# Patient Record
Sex: MALE | Race: WHITE
[De-identification: names, ages, dates, MRNs, and addresses within clinical notes are randomized per-mention and may not be internally consistent; named-entity substitution may affect disease eponyms.]

---

## 2020-01-20 ENCOUNTER — HOSPITAL ENCOUNTER (INPATIENT)
Dept: HOSPITAL 77 - KA.MS | Age: 50
LOS: 4 days | Discharge: HOME | DRG: 383 | End: 2020-01-24
Attending: FAMILY MEDICINE | Admitting: NURSE PRACTITIONER
Payer: COMMERCIAL

## 2020-01-20 DIAGNOSIS — R79.1: ICD-10-CM

## 2020-01-20 DIAGNOSIS — E66.9: ICD-10-CM

## 2020-01-20 DIAGNOSIS — L30.9: ICD-10-CM

## 2020-01-20 DIAGNOSIS — I87.2: ICD-10-CM

## 2020-01-20 DIAGNOSIS — K59.00: ICD-10-CM

## 2020-01-20 DIAGNOSIS — L03.116: Primary | ICD-10-CM

## 2020-01-20 LAB
ANION GAP SERPL CALC-SCNC: 13.3 MMOL/L (ref 5–15)
CHLORIDE SERPL-SCNC: 98 MMOL/L (ref 98–115)
SODIUM SERPL-SCNC: 133 MMOL/L (ref 136–145)

## 2020-01-20 PROCEDURE — G0378 HOSPITAL OBSERVATION PER HR: HCPCS

## 2020-01-20 RX ADMIN — ENOXAPARIN SODIUM SCH MG: 150 INJECTION SUBCUTANEOUS at 21:31

## 2020-01-21 LAB
BASE EXCESS BLDA CALC-SCNC: -2 MMOL/L (ref -2–3)
HCO3 BLDA-SCNC: 22.3 MMOL/L (ref 22–26)
INHALED O2 MECHANISM DOSE: (no result)
PCO2 BLDA: 33 MMHG (ref 35–45)
PO2 BLDA: 84 MMHG (ref 80–105)
SAO2 % BLDA: 97 % (ref 95–98)

## 2020-01-21 RX ADMIN — ENOXAPARIN SODIUM SCH MG: 150 INJECTION SUBCUTANEOUS at 22:10

## 2020-01-21 RX ADMIN — Medication PRN ML: at 12:30

## 2020-01-21 RX ADMIN — ENOXAPARIN SODIUM SCH MG: 150 INJECTION SUBCUTANEOUS at 09:16

## 2020-01-21 NOTE — PCM.PN
- General Info


Date of Service: 01/21/20


Functional Status: Reports: Pain Controlled, Tolerating Diet, Urinating.  Denies

: Ambulating, New Symptoms





- Review of Systems


General: Denies: Fever, Weakness, Fatigue, Malaise


HEENT: Reports: No Symptoms


Pulmonary: Reports: No Symptoms


Cardiovascular: Reports: No Symptoms


Gastrointestinal: Reports: No Symptoms


Genitourinary: Reports: No Symptoms


Musculoskeletal: Reports: No Symptoms


Skin: Reports: Rash


Neurological: Reports: No Symptoms


Psychiatric: Reports: No Symptoms





- Patient Data


Vitals - Most Recent: 


 Last Vital Signs











Temp  98.8 F   01/21/20 06:40


 


Pulse  82   01/21/20 06:40


 


Resp  18   01/21/20 06:40


 


BP  129/78   01/21/20 06:40


 


Pulse Ox  99   01/21/20 06:40











Weight - Most Recent: 253 lb 9.6 oz


I&O - Last 24 Hours: 


 Intake & Output











 01/20/20 01/21/20 01/21/20





 22:59 06:59 14:59


 


Intake Total  90 


 


Balance  90 











Lab Results Last 24 Hours: 


 Laboratory Results - last 24 hr











  01/20/20 01/20/20 01/20/20 Range/Units





  19:40 19:40 19:40 


 


WBC  7.42    (5.00-10.00)  10^3/uL


 


RBC  4.88    (4.50-6.00)  10^6/uL


 


Hgb  14.8    (13.0-17.0)  g/dL


 


Hct  42.7    (40.0-52.0)  %


 


MCV  87.5    (82.0-92.0)  fL


 


MCH  30.3    (27.0-31.0)  pg


 


MCHC  34.7    (32.0-36.0)  g/dL


 


RDW  12.8    (11.5-14.5)  %


 


Plt Count  235    (150-400)  10^3/uL


 


MPV  9.1    (7.4-10.4)  fL


 


Immature Gran % (Auto)  0.1    (0.0-5.0)  %


 


Neut % (Auto)  70.7 H    (50.0-70.0)  %


 


Lymph % (Auto)  18.3 L    (20.0-40.0)  %


 


Mono % (Auto)  9.0 H    (2.0-8.0)  %


 


Eos % (Auto)  1.6    (1.0-3.0)  %


 


Baso % (Auto)  0.3    (0.0-1.0)  %


 


Immature Gran # (Auto)  0.01    (0.00-0.50)  10^3/uL


 


Neut # (Auto)  5.24    (2.50-7.00)  10^3/uL


 


Lymph # (Auto)  1.36    (1.00-4.00)  10^3/uL


 


Mono # (Auto)  0.67    (0.10-0.80)  10^3/uL


 


Eos # (Auto)  0.12    (0.10-0.30)  10^3/uL


 


Baso # (Auto)  0.02    (0.00-0.10)  10^3/uL


 


D-Dimer, Quantitative    747 H  (<400)  ng/mL


 


Sodium   133 L   (136-145)  mmol/L


 


Potassium   3.9   (3.3-5.3)  mmol/L


 


Chloride   98   ()  mmol/L


 


Carbon Dioxide   25.6   (21.0-32.0)  mmol/L


 


Anion Gap   13.3   (5-15)  mmol/L


 


BUN   15   (6-25)  mg/dL


 


Creatinine   1.00   (0.51-1.17)  mg/dL


 


Est Cr Clr Drug Dosing   TNP   


 


Estimated GFR (MDRD)   > 60   mL/min


 


Glucose   143 H  (75 - 99) mg/dL


 


Calcium   8.8   (8.7-10.3)  mg/dL


 


Total Bilirubin   0.4   (0.2-1.0)  mg/dL


 


AST   19   (15-37)  U/L


 


ALT   32   (12-78)  U/L


 


Alkaline Phosphatase   54   ()  IU/L


 


C-Reactive Protein   11.8 H   (0.0-0.9)  mg/dL


 


Total Protein   7.9   (6.4-8.2)  g/dL


 


Albumin   3.41   (3.00-4.80)  g/dL











Med Orders - Current: 


 Current Medications





Enoxaparin Sodium (Lovenox)  115 mg SUBCUT BID [facility]


   Last Admin: 01/21/20 09:16 Dose:  115 mg


Cefazolin Sodium 1.5 gm/ (Sodium Chloride)  50 mls @ 200 mls/hr IV Q8H [facility]


   Last Admin: 01/21/20 03:50 Dose:  200 mls/hr


Sodium Chloride (Saline Flush)  10 ml FLUSH Q8HR PRN


   PRN Reason: keep vein open





Discontinued Medications





Sodium Chloride (Normal Saline) Confirm Administered Dose 100 mls @ as directed 

.ROUTE .STK-MED ONE


   Stop: 01/20/20 20:59


   Last Admin: 01/20/20 21:00 Dose:  50 mls/hr











- Exam


Quality Assessment: DVT Prophylaxis.  No: Supplemental Oxygen


General: Alert, Oriented


Neck: Supple


Lungs: Clear to Auscultation, Normal Respiratory Effort


Cardiovascular: Regular Rate, Regular Rhythm


GI/Abdominal Exam: Normal Bowel Sounds, Soft, Non-Tender, No Organomegaly, No 

Distention, No Abnormal Bruit, No Mass, Pelvis Stable


Back Exam: No: CVA Tenderness (L), CVA Tenderness (R)


Wound/Incisions: Erythema


Psy/Mental Status: Alert, Normal Affect, Normal Mood





Sepsis Event Note





- Evaluation


Sepsis Screening Result: No Definite Risk





- Focused Exam


Vital Signs: 


 Vital Signs











  Temp Pulse Resp BP Pulse Ox Pulse Ox


 


 01/21/20 06:40  98.8 F  82  18  129/78  99 


 


 01/21/20 06:35       99


 


 01/20/20 22:58  98.3 F  87  18  138/80  99 











Date Exam was Performed: 01/21/20


Time Exam was Performed: 10:37





- Problem List Review


Problem List Initiated/Reviewed/Updated: Yes





- Plan


Plan:: 


Brief history


This 49-year-old obese gentleman was admitted due to cellulitis.  He was seen 

yesterday [facility] and was admitted by Erin Howe NP and came in 

due to 1 to 2-day l It is not painful when he walks but it does hurt to the 

touch.  He has not put anything on it or taken anything for it.   He denotes at 

one point in a dry patchy area on his leg in which a dermatologist diagnosed 

him with eczema.  He denies having any injury or break in skin. 





work-up to date, 


--elevated d-dimer, 


--blood cultures pending.  


--White count normal however elevated ESR. 


--cefazolin started 





Hospital course to date


Patient is sitting in a chair, no fever, much improved erythema receding from 

margins left lower extremity, negative chest pain.  negative shortness of 

breath negative Homans Hospital problems


--Cellulitis, LLL, improving,


--Rule out DVT, CT chest today


--Constipation 


--Venous insufficiency, presumptive








Disposition/overall plan


--ABG if Pa02 is normal likely will forego chest CT


--EKG


--venous Doppler


--Continue antibiotics,


--Elevate LLE, toes to nose


--DVT prophylaxis

## 2020-01-21 NOTE — US
______________________________________________________________________________   

  

[id number] US/US Venous Doppler LE Left  

EXAM: LEFT LOWER EXTREMITY DUPLEX ULTRASOUND  

   

 INDICATION: LEFT LOWER EXTREMITY SWELLING AND REDNESS.  

   

 COMPARISON: None.  

   

 DISCUSSION: The deep venous structures are compressible. No valvular  

 incompetence or pulsatility seen. Spontaneity, phasicity and response to  

 augmentation were noted by the technologist.  

   

 Prominent lymph node noted in the left groin measuring 31 x 17 x 16 mm.  

   

 IMPRESSION:  

 1.  No evidence of deep vein thrombosis in the left lower extremity.  

   

 Electronically signed by Jonah Clayton MD on 1/21/2020 10:38 AM  

   

  

Jonah Clayton MD                 

 01/21/20 1041    

  

Thank you for allowing us to participate in the care of your patient.

## 2020-01-22 RX ADMIN — Medication PRN ML: at 13:25

## 2020-01-22 NOTE — PCM.PN
- General Info


Date of Service: 01/22/20


Functional Status: Reports: Pain Controlled, Tolerating Diet, Ambulating





- Review of Systems


General: Reports: No Symptoms


Pulmonary: Reports: No Symptoms


Cardiovascular: Reports: Edema


Gastrointestinal: Reports: No Symptoms


Skin: Reports: Rash


Neurological: Denies: Confusion


Psychiatric: Reports: No Symptoms





- Patient Data


Vitals - Most Recent: 


 Last Vital Signs











Temp  97.1 F   01/22/20 06:53


 


Pulse  77   01/22/20 06:53


 


Resp  16   01/22/20 06:53


 


BP  126/78   01/22/20 06:53


 


Pulse Ox  96   01/22/20 06:53











Weight - Most Recent: 253 lb 9.6 oz


I&O - Last 24 Hours: 


 Intake & Output











 01/21/20 01/22/20 01/22/20





 22:59 06:59 14:59


 


Intake Total 540 250 


 


Balance 540 250 











Lab Results Last 24 Hours: 


 Laboratory Results - last 24 hr











  01/21/20 Range/Units





  11:37 


 


ABG pH  7.44  (7.35-7.45)  


 


ABG pCO2  33 L  (35-45)  mmHG


 


ABG pO2  84  ()  mmHG


 


ABG HCO3  22.3  (22-26)  mmol/L


 


ABG Total CO2  23  (23-27)  mmol/L


 


ABG O2 Saturation  97  (95-98)  %


 


ABG Base Excess  -2  (-2-3)  mmol/L


 


O2 Delivery Device  Room air  











Mukund Results Last 24 Hours: 


 Microbiology











 01/20/20 20:00 Aerobic Blood Culture - Preliminary





 Blood - Venous - Lab Draw    NO GROWTH AFTER 1 DAY





 Anaerobic Blood Culture - Preliminary





    NO GROWTH AFTER 1 DAY


 


 01/20/20 19:40 Aerobic Blood Culture - Preliminary





 Blood - Venous    NO GROWTH AFTER 1 DAY





 Anaerobic Blood Culture - Preliminary





    NO GROWTH AFTER 1 DAY











Med Orders - Current: 


 Current Medications





Enoxaparin Sodium (Lovenox)  40 mg SUBCUT BID Counts include 234 beds at the [facility]


   Last Admin: 01/22/20 09:36 Dose:  40 mg


Cefazolin Sodium 1.5 gm/ (Sodium Chloride)  100 mls @ 400 mls/hr IV Q8H Counts include 234 beds at the [facility]


   Last Admin: 01/22/20 05:53 Dose:  400 mls/hr


Sodium Chloride (Normal Saline)  50 mls @ 125 mls/hr IV ASDIRECTED Counts include 234 beds at the [facility]


   Last Admin: 01/22/20 05:53 Dose:  125 mls/hr


Sodium Chloride (Saline Flush)  10 ml FLUSH Q8HR PRN


   PRN Reason: keep vein open


   Last Admin: 01/21/20 12:30 Dose:  10 ml





Discontinued Medications





Enoxaparin Sodium (Lovenox)  115 mg SUBCUT BID Counts include 234 beds at the [facility]


   Last Admin: 01/21/20 22:10 Dose:  115 mg


Cefazolin Sodium 1.5 gm/ (Sodium Chloride)  50 mls @ 200 mls/hr IV Q8H Counts include 234 beds at the [facility]


   Last Admin: 01/21/20 12:30 Dose:  200 mls/hr


Sodium Chloride (Normal Saline) Confirm Administered Dose 100 mls @ as directed 

.ROUTE .STK-MED ONE


   Stop: 01/20/20 20:59


   Last Admin: 01/20/20 21:00 Dose:  50 mls/hr


Cefazolin Sodium 1.5 gm/ (Sodium Chloride)  100 mls @ 400 mls/hr IV Q8H [facility]











- Exam


Quality Assessment: DVT Prophylaxis.  No: Supplemental Oxygen


General: Alert, Oriented, Cooperative


Lungs: Clear to Auscultation


Cardiovascular: Regular Rate, Regular Rhythm


Extremities: Pedal Edema (mild LLE)


Peripheral Pulses: 2+: Radial (L), Radial (R)


Skin: Warm, Rash


Wound/Incisions: No Drainage, Erythema Improving, Other (negative Homans)





Sepsis Event Note





- Evaluation


Sepsis Screening Result: No Definite Risk





- Focused Exam


Vital Signs: 


 Vital Signs











  Temp Pulse Resp BP Pulse Ox


 


 01/22/20 06:53  97.1 F  77  16  126/78  96


 


 01/21/20 23:00  97.5 F  80  16  128/76  97











Date Exam was Performed: 01/22/20


Time Exam was Performed: 09:45





- Problem List Review


Problem List Initiated/Reviewed/Updated: Yes





- My Orders


Last 24 Hours: 


My Active Orders





01/21/20 11:10


EKG 12 Lead [EK] Routine 





01/21/20 11:11


EKG Documentation Completion [RC] ASDIRECTED 





01/21/20 11:55


Patient Status [ADT] Routine 





01/22/20 08:30


CRP [C-REACTIVE PROTEIN] [CHEM] Routine 





01/22/20 09:00


Enoxaparin [Lovenox]   40 mg SUBCUT BID 














- Plan


Plan:: 


Brief history


This 49-year-old obese gentleman was admitted due to cellulitis.  He was seen 

yesterday [facility] and was admitted by Erin Howe NP and came in 

due to 1 to 2-day l It is not painful when he walks but it does hurt to the 

touch.  He has not put anything on it or taken anything for it.   He denotes at 

one point in a dry patchy area on his leg in which a dermatologist diagnosed 

him with eczema.  He denies having any injury or break in skin. 





work-up to date, 


--elevated d-dimer, 


--blood cultures pending.  


--White count normal however elevated ESR. 


--cefazolin started 





Hospital course to date


Day #1:  Patient is sitting in a chair, no fever, much improved erythema 

receding from margins left lower extremity, negative chest pain.  negative 

shortness of breath negative Homans





Day #2: Doing well, redness slowly receding however as lagging indicator in 

cellulitis, wrinkling noted, less warmth today. No pain. BC no growth. No 

fever. 











Hospital problems


--Cellulitis, LLL, improving,


--Constipation, improving 


--Venous insufficiency, presumptive











Disposition/overall plan-


--Continue antibiotics,


--CRP in Am. 


--Elevate LLE, toes to nose


--DVT prophylaxis, reduce dose today


--Anticipate DC Friday on PO ABX with close f/u.

## 2020-01-23 RX ADMIN — Medication PRN ML: at 13:32

## 2020-01-23 RX ADMIN — Medication PRN ML: at 05:16

## 2020-01-23 NOTE — PCM.PN
- General Info


Date of Service: 01/23/20


Functional Status: Reports: Pain Controlled, Tolerating Diet, Ambulating.  

Denies: New Symptoms





- Review of Systems


General: Reports: No Symptoms


HEENT: Reports: No Symptoms


Pulmonary: Reports: No Symptoms


Cardiovascular: Reports: No Symptoms


Gastrointestinal: Reports: No Symptoms


Genitourinary: Reports: No Symptoms


Musculoskeletal: Denies: Leg Pain


Skin: Reports: Rash


Neurological: Denies: Confusion





- Patient Data


Vitals - Most Recent: 


 Last Vital Signs











Temp  97.1 F   01/23/20 07:00


 


Pulse  72   01/23/20 07:00


 


Resp  16   01/23/20 07:00


 


BP  127/84   01/23/20 07:00


 


Pulse Ox  98   01/23/20 07:00











Weight - Most Recent: 253 lb 9.6 oz


I&O - Last 24 Hours: 


 Intake & Output











 01/22/20 01/23/20 01/23/20





 22:59 06:59 14:59


 


Intake Total 1035 281 


 


Balance 1035 281 











Lab Results Last 24 Hours: 


 Laboratory Results - last 24 hr











  01/22/20 Range/Units





  09:45 


 


C-Reactive Protein  14.9 H  (0.0-0.9)  mg/dL











Mukund Results Last 24 Hours: 


 Microbiology











 01/20/20 20:00 Aerobic Blood Culture - Preliminary





 Blood - Venous - Lab Draw    NO GROWTH AFTER 2 DAYS





 Anaerobic Blood Culture - Preliminary





    NO GROWTH AFTER 2 DAYS


 


 01/20/20 19:40 Aerobic Blood Culture - Preliminary





 Blood - Venous    NO GROWTH AFTER 2 DAYS





 Anaerobic Blood Culture - Preliminary





    NO GROWTH AFTER 2 DAYS











Med Orders - Current: 


 Current Medications





Enoxaparin Sodium (Lovenox)  40 mg SUBCUT BID [facility]


   Last Admin: 01/23/20 09:26 Dose:  40 mg


Cefazolin Sodium 1.5 gm/ (Sodium Chloride)  100 mls @ 400 mls/hr IV Q8H [facility]


   Last Admin: 01/23/20 05:15 Dose:  400 mls/hr


Sodium Chloride (Normal Saline)  50 mls @ 125 mls/hr IV ASDIRECTED [facility]


   Last Admin: 01/22/20 05:53 Dose:  125 mls/hr


Sodium Chloride (Saline Flush)  10 ml FLUSH Q8HR PRN


   PRN Reason: keep vein open


   Last Admin: 01/23/20 05:16 Dose:  10 ml





Discontinued Medications





Enoxaparin Sodium (Lovenox)  115 mg SUBCUT BID [facility]


   Last Admin: 01/21/20 22:10 Dose:  115 mg


Cefazolin Sodium 1.5 gm/ (Sodium Chloride)  50 mls @ 200 mls/hr IV Q8H [facility]


   Last Admin: 01/21/20 12:30 Dose:  200 mls/hr


Sodium Chloride (Normal Saline) Confirm Administered Dose 100 mls @ as directed 

.ROUTE .STK-MED ONE


   Stop: 01/20/20 20:59


   Last Admin: 01/20/20 21:00 Dose:  50 mls/hr


Cefazolin Sodium 1.5 gm/ (Sodium Chloride)  100 mls @ 400 mls/hr IV Q8H [facility]











- Exam


Quality Assessment: DVT Prophylaxis


General: Alert, Oriented


Lungs: Clear to Auscultation, Normal Respiratory Effort


Cardiovascular: Regular Rate, Regular Rhythm


Extremities: No Pedal Edema


Skin: Rash (Some wrinkling less receding today, decreasing warmth)


Wound/Incisions: No Drainage, Erythema, Erythema Improving


Psy/Mental Status: Alert, Normal Affect, Normal Mood





Sepsis Event Note





- Evaluation


Sepsis Screening Result: No Definite Risk





- Focused Exam


Vital Signs: 


 Vital Signs











  Temp Pulse Resp BP Pulse Ox


 


 01/23/20 07:00  97.1 F  72  16  127/84  98


 


 01/22/20 23:00  97.3 F  76  16  137/85  97











Date Exam was Performed: 01/23/20


Time Exam was Performed: 10:08





- Problem List Review


Problem List Initiated/Reviewed/Updated: Yes





- My Orders


Last 24 Hours: 


My Active Orders





01/24/20 05:11


C-REACTIVE PROTEIN [CHEM] AM 





01/24/20 05:15


CBC WITH AUTO DIFF [HEME] AM 














- Plan


Plan:: 


Brief history


This 49-year-old obese gentleman was admitted due to cellulitis.  He was seen 

yesterday [facility] and was admitted by Erin Howe NP and came in 

due to 1 to 2-day l It is not painful when he walks but it does hurt to the 

touch.  He has not put anything on it or taken anything for it.   He denotes at 

one point in a dry patchy area on his leg in which a dermatologist diagnosed 

him with eczema.  He denies having any injury or break in skin. 





work-up to date, 


--elevated d-dimer, 


--blood cultures pending.  


--White count normal however elevated ESR. 


--cefazolin started 





Hospital course to date


Day #1:  Patient is sitting in a chair, no fever, much improved erythema 

receding from margins left lower extremity, negative chest pain.  negative 

shortness of breath negative Homans





Day #2: Doing well, redness slowly receding however as lagging indicator in 

cellulitis, wrinkling noted, less warmth today. No pain. BC no growth. No 

fever. increasing CRP levels. 





Day #3;  Doing well, no concerns, less redness receding however much less warmth

, more wrinkling noted, less swelling, no longer painful.  Neg Homans.  no 

growth. No fever.











Hospital problems


--Cellulitis, LLL, improving,


--Constipation, improving 


--Venous insufficiency, presumptive likely contributable. 











Disposition/overall plan-


--Continue antibiotics,


--Trend CRP in Am. 


--Elevate LLE, toes to nose


--DVT prophylaxis, LMWH, monitor PLT


--Anticipate DC tomorrowl on PO abx. with close f/u.

## 2020-01-24 RX ADMIN — Medication PRN ML: at 05:36

## 2020-01-24 NOTE — [id number]
**Discharge Summary





- Hospital Course


Diagnosis: Stroke: No





- Discharge Data


Discharge Date: 01/24/20


Discharge Disposition: Home, Self-Care 01


Condition: Good





- Referral to Home Health


Primary Care Physician: 


Erin Howe NP








- Patient Instructions


Activity: Elevate Extremity, No Strenuous Activities


Driving: May Drive Today


Showering/Bathing: May Shower, No Tub Bathing/Swimming


Notify Provider of: Fever, Increased Pain, Swelling and Redness, Drainage


Other/Special Instructions: --Elevate leg "toes to level of nose".  --You have 

antibiotics to  at your pharmacy in [place]





- Discharge Plan


*PRESCRIPTION DRUG MONITORING PROGRAM REVIEWED*: Not Applicable


*COPY OF PRESCRIPTION DRUG MONITORING REPORT IN PATIENT PAOLA: Not Applicable


Home Medications: 


 Home Meds





. [No Known Home Meds]  01/20/20 [History]








Referrals: 


Gaby Treviño PA-C [Physician Assistant] -  ([facility] or 

anybody else in [facility])





- Discharge Summary/Plan Comment


DC Time >30 min.: No


Discharge Summary/Plan Comment: 


Final Dx


--Cellulitis, LLL, improving, non-erysipelas


--Venous insufficiency, presumptive likely contributable 








Brief history/prehospital


This 49-year-old obese gentleman was initially admitted into OBS status and 

changed to inpatient and due to cellulitis in his left lower extremity.  He was 

seen day before admission at an [facility] and was admitted by Erin Howe NP when the patient came in with ~1-2 days of significant erythema to 

LLE.  No injury history, no history of cellulitis however he denotes at one 

point having a dry patchy area on his leg in which a dermatologist diagnosed 

him with eczema.  He denies having any injury or break in skin. 





Pertinent work-up


--elevated d-dimer, 


--blood cultures were drawn


--White count normal however elevated ESR. 


--cefazolin started 





Hospital course 


Day #1:  Patient was sitting in a chair, no fever, much improved erythema 

receding from margins left lower extremity, negative chest pain.  negative 

shortness of breath negative Homans.  Ancef were started he had no allergic 

reaction or side effects.  DVT prophylaxis was started.  Ultrasound was done 

patient had negative DVT.





Day #2: Ongoing improvementl, redness slowly receding however as lagging 

indicator in cellulitis, wrinkling noted, less warmth, No pain. BC no growth. 

No fever.  However increasing CRP levels. 





Day #3; did well, there was no overnight concerns, less redness receding 

however much less warmth, more wrinkling was noted, less swelling, no longer 

painful.  Neg Homans.  Blood cultures no growth. No fever.





Day #4; charge day, CRP trended down, much improvement, blood cultures no growth

, no pain, decreased redness depth





Medication changes/adjustments upon discharge


Doxycycline 500 mg every 6 hours, ordered through [facility]








Disposition/overall plan


--Patient will be discharged from [facility], he was given specific 

instructions on elevation,


--[facility]


--Continue antibiotics,


--Recommendations at follow-up, set patient up for venous insufficiency studies





- General Info


Functional Status: Reports: Pain Controlled





- Review of Systems


General: Reports: No Symptoms


Pulmonary: Reports: No Symptoms


Cardiovascular: Reports: No Symptoms


Gastrointestinal: Reports: No Symptoms


Musculoskeletal: Denies: Leg Pain, Joint Swelling


Skin: Reports: Rash


Neurological: Denies: Confusion


Psychiatric: Denies: Confusion





- Patient Data


Vitals - Most Recent: 


 Last Vital Signs











Temp  97.3 F   01/24/20 06:43


 


Pulse  74   01/24/20 06:43


 


Resp  16   01/24/20 06:43


 


BP  124/77   01/24/20 06:43


 


Pulse Ox  95   01/24/20 07:00











Weight - Most Recent: 253 lb 9.6 oz


I&O - Last 24 hours: 


 Intake & Output











 01/23/20 01/24/20 01/24/20





 22:59 06:59 14:59


 


Intake Total 1026 335 


 


Balance 1026 335 











Lab Results - Last 24 hrs: 


 Laboratory Results - last 24 hr











  01/24/20 01/24/20 Range/Units





  07:22 07:22 


 


WBC   5.81  (5.00-10.00)  10^3/uL


 


RBC   4.86  (4.50-6.00)  10^6/uL


 


Hgb   14.7  (13.0-17.0)  g/dL


 


Hct   43.1  (40.0-52.0)  %


 


MCV   88.7  (82.0-92.0)  fL


 


MCH   30.2  (27.0-31.0)  pg


 


MCHC   34.1  (32.0-36.0)  g/dL


 


RDW   12.7  (11.5-14.5)  %


 


Plt Count   388  D  (150-400)  10^3/uL


 


MPV   8.9  (7.4-10.4)  fL


 


Immature Gran % (Auto)   0.7  (0.0-5.0)  %


 


Neut % (Auto)   53.6  (50.0-70.0)  %


 


Lymph % (Auto)   27.7  (20.0-40.0)  %


 


Mono % (Auto)   10.2 H  (2.0-8.0)  %


 


Eos % (Auto)   7.1 H  (1.0-3.0)  %


 


Baso % (Auto)   0.7  (0.0-1.0)  %


 


Immature Gran # (Auto)   0.04  (0.00-0.50)  10^3/uL


 


Neut # (Auto)   3.12  (2.50-7.00)  10^3/uL


 


Lymph # (Auto)   1.61  (1.00-4.00)  10^3/uL


 


Mono # (Auto)   0.59  (0.10-0.80)  10^3/uL


 


Eos # (Auto)   0.41 H  (0.10-0.30)  10^3/uL


 


Baso # (Auto)   0.04  (0.00-0.10)  10^3/uL


 


C-Reactive Protein  5.7 H   (0.0-0.9)  mg/dL











ENDY Results - Last 24 hrs: 


 Microbiology











 01/20/20 20:00 Aerobic Blood Culture - Preliminary





 Blood - Venous - Lab Draw    NO GROWTH AFTER 3 DAYS





 Anaerobic Blood Culture - Preliminary





    NO GROWTH AFTER 3 DAYS


 


 01/20/20 19:40 Aerobic Blood Culture - Preliminary





 Blood - Venous    NO GROWTH AFTER 3 DAYS





 Anaerobic Blood Culture - Preliminary





    NO GROWTH AFTER 3 DAYS











Med Orders - Current: 


 Current Medications





Enoxaparin Sodium (Lovenox)  40 mg SUBCUT BID [facility]


   Last Admin: 01/24/20 08:44 Dose:  40 mg


Cefazolin Sodium 1.5 gm/ (Sodium Chloride)  100 mls @ 400 mls/hr IV Q8H [facility]


   Last Admin: 01/24/20 05:34 Dose:  400 mls/hr


Sodium Chloride (Normal Saline)  50 mls @ 125 mls/hr IV ASDIRECTED [facility]


   Last Admin: 01/24/20 05:35 Dose:  125 mls/hr


Sodium Chloride (Saline Flush)  10 ml FLUSH Q8HR PRN


   PRN Reason: keep vein open


   Last Admin: 01/24/20 05:36 Dose:  10 ml





Discontinued Medications





Enoxaparin Sodium (Lovenox)  115 mg SUBCUT BID [facility]


   Last Admin: 01/21/20 22:10 Dose:  115 mg


Cefazolin Sodium 1.5 gm/ (Sodium Chloride)  50 mls @ 200 mls/hr IV Q8H [facility]


   Last Admin: 01/21/20 12:30 Dose:  200 mls/hr


Sodium Chloride (Normal Saline) Confirm Administered Dose 100 mls @ as directed 

.ROUTE .STK-MED ONE


   Stop: 01/20/20 20:59


   Last Admin: 01/20/20 21:00 Dose:  50 mls/hr


Cefazolin Sodium 1.5 gm/ (Sodium Chloride)  100 mls @ 400 mls/hr IV Q8H [facility]











- Exam


Cardiovascular: Reports: Regular Rate


Extremities: No Pedal Edema, Other (nomrla distal pedal pulse dorsalis pedis)


Skin: Reports: Warm


Wound/Incisions: Reports: Erythema, Erythema Improving


Psy/Mental Status: Reports: Alert

## 2022-09-04 ENCOUNTER — HOSPITAL ENCOUNTER (EMERGENCY)
Dept: HOSPITAL 52 - LL.ED | Age: 52
Discharge: HOME | End: 2022-09-04
Payer: COMMERCIAL

## 2022-09-04 DIAGNOSIS — T26.42XA: ICD-10-CM

## 2022-09-04 DIAGNOSIS — T22.292A: ICD-10-CM

## 2022-09-04 DIAGNOSIS — T20.20XA: Primary | ICD-10-CM
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
## 2022-09-04 RX ADMIN — NEOMYCIN AND POLYMYXIN B SULFATES AND BACITRACIN ZINC ONE EACH: 400; 3.5; 5 OINTMENT TOPICAL at 22:23

## 2022-09-05 RX ADMIN — NEOMYCIN SULFATE, POLYMYXIN B SULFATE AND BACITRACIN ZINC SCH INCH: 3.5; 10000; 4 OINTMENT OPHTHALMIC at 00:23

## 2022-10-03 ENCOUNTER — HOSPITAL ENCOUNTER (OUTPATIENT)
Dept: HOSPITAL 52 - LL.SDS | Age: 52
End: 2022-10-03
Attending: SURGERY
Payer: COMMERCIAL

## 2022-10-03 DIAGNOSIS — K64.8: ICD-10-CM

## 2022-10-03 DIAGNOSIS — Z80.0: ICD-10-CM

## 2022-10-03 DIAGNOSIS — Z12.11: Primary | ICD-10-CM

## 2022-10-03 DIAGNOSIS — K57.30: ICD-10-CM
